# Patient Record
Sex: FEMALE | Race: BLACK OR AFRICAN AMERICAN | Employment: FULL TIME | URBAN - METROPOLITAN AREA
[De-identification: names, ages, dates, MRNs, and addresses within clinical notes are randomized per-mention and may not be internally consistent; named-entity substitution may affect disease eponyms.]

---

## 2017-01-26 ENCOUNTER — OFFICE VISIT (OUTPATIENT)
Dept: INTERNAL MEDICINE CLINIC | Age: 42
End: 2017-01-26

## 2017-01-26 VITALS
HEART RATE: 86 BPM | RESPIRATION RATE: 18 BRPM | DIASTOLIC BLOOD PRESSURE: 77 MMHG | BODY MASS INDEX: 28.12 KG/M2 | OXYGEN SATURATION: 98 % | HEIGHT: 66 IN | TEMPERATURE: 98.8 F | SYSTOLIC BLOOD PRESSURE: 116 MMHG | WEIGHT: 175 LBS

## 2017-01-26 DIAGNOSIS — Z79.899 ENCOUNTER FOR LONG-TERM (CURRENT) USE OF MEDICATIONS: ICD-10-CM

## 2017-01-26 DIAGNOSIS — Z87.898 HISTORY OF SYNCOPE: Primary | ICD-10-CM

## 2017-01-26 DIAGNOSIS — Z12.31 SCREENING MAMMOGRAM, ENCOUNTER FOR: ICD-10-CM

## 2017-01-26 DIAGNOSIS — D50.9 IRON DEFICIENCY ANEMIA, UNSPECIFIED IRON DEFICIENCY ANEMIA TYPE: ICD-10-CM

## 2017-01-26 DIAGNOSIS — Z98.84 H/O GASTRIC BYPASS: ICD-10-CM

## 2017-01-26 DIAGNOSIS — Z86.39 HISTORY OF HYPOGLYCEMIA: ICD-10-CM

## 2017-01-26 DIAGNOSIS — F31.9 BIPOLAR AFFECTIVE DISORDER, REMISSION STATUS UNSPECIFIED (HCC): ICD-10-CM

## 2017-01-26 RX ORDER — QUETIAPINE FUMARATE 100 MG/1
TABLET, FILM COATED ORAL
Refills: 1 | COMMUNITY
Start: 2016-11-23

## 2017-01-26 RX ORDER — LITHIUM CARBONATE 300 MG/1
TABLET, FILM COATED, EXTENDED RELEASE ORAL
Refills: 1 | COMMUNITY
Start: 2016-11-23

## 2017-01-26 NOTE — PROGRESS NOTES
History:   Tabby Guadarrama is a 39 y.o. female presenting today for an initial visit to [facility] care. History  gastric bypass, syncopal episode, anemia, and bipolar disorder. Pt previously received primary care from [facility]. Pt reports feeling well today. History of gastric bypass: Performed 4/2006. Pt has tolerated the procedure well overall and reports losing approximately 193 lbs. She has experienced occasional hypoglycemia. She denies n/v, abdominal pain, or diarrhea. History of syncope:  Pt reports 3 episodes in 2016. The last episode was 4 months ago. She received a workup at that time at [facility]. Pt reports that she was found to be hypoglycemic. She denies any recent episodes. She describes experiencing a warm sensation and feeling jittery prior to previous episodes. There was no h/o shaking, tongue biting, loss of bowel or bladder contents, or confusion. Since that time she has been able to control symptoms by eating when she feels the onset of symptoms. She denies chest pain, palpitations, dizziness, or headache. Anemia: Pt reports history of iron deficiency anemia. She currently takes an iron supplement. She denies history of blood transfusion. She denies SOB, chest pain, palpitations, or fatigue. Diagnosed with bipolar disorder 5 years ago. Symptoms have been stable on current regimen of lithium and seroquel. Followed by psychiatrist Dr. Vicky Shipman. Past Medical History   Diagnosis Date    Headache     Psychotic disorder        Past Surgical History   Procedure Laterality Date    Hx gi         Social History     Social History    Marital status: UNKNOWN     Spouse name: N/A    Number of children: N/A    Years of education: N/A     Occupational History    Not on file.      Social History Main Topics    Smoking status: Never Smoker    Smokeless tobacco: Never Used    Alcohol use Yes    Drug use: No    Sexual activity: Yes     Partners: Male Birth control/ protection: Condom     Other Topics Concern    Not on file     Social History Narrative    No narrative on file       Family History   Problem Relation Age of Onset    Hypertension Mother     Diabetes Mother     No Known Problems Father     No Known Problems Sister     No Known Problems Brother        No current outpatient prescriptions on file prior to visit. No current facility-administered medications on file prior to visit. No Known Allergies    Review of Systems   Constitutional: Negative. HENT: Negative. Eyes: Negative. Respiratory: Negative. Gastrointestinal: Negative. Genitourinary: Negative. Musculoskeletal: Negative. Skin: Negative. Neurological: Positive for loss of consciousness (no recent episodes). Psychiatric/Behavioral: Positive for depression. Negative for hallucinations, memory loss, substance abuse and suicidal ideas. The patient is not nervous/anxious and does not have insomnia. Objective:   VS:    Visit Vitals    /77 (BP 1 Location: Left arm, BP Patient Position: Sitting)    Pulse 86    Temp 98.8 °F (37.1 °C) (Oral)    Resp 18    Ht 5' 6\" (1.676 m)    Wt 175 lb (79.4 kg)    LMP 01/07/2017 (Approximate)    SpO2 98%    BMI 28.25 kg/m2     Physical Exam   Constitutional: She is oriented to person, place, and time. She appears well-developed and well-nourished. HENT:   Head: Normocephalic and atraumatic. Mouth/Throat: Oropharynx is clear and moist.   Eyes: Conjunctivae and EOM are normal. Pupils are equal, round, and reactive to light. Neck: Normal range of motion. Neck supple. No thyromegaly present. Cardiovascular: Normal rate, regular rhythm, normal heart sounds and intact distal pulses. Pulmonary/Chest: Effort normal and breath sounds normal.   Abdominal: Soft. Bowel sounds are normal.   Musculoskeletal: Normal range of motion. She exhibits no edema.    Neurological: She is alert and oriented to person, place, and time. She has normal reflexes. No cranial nerve deficit. Skin: Skin is warm and dry. Psychiatric: She has a normal mood and affect. Her behavior is normal.   Nursing note and vitals reviewed. Assessment/ Plan:     Elmer Solis was seen today for establish care, advice only and dizziness. Diagnoses and all orders for this visit:    History of syncope        -     No episodes in the last 4 months        -     Previous workup performed, may be related to hypoglycemia  -     Will request medical records      History of hypoglycemia        -     May be reactive hypoglycemia related to gastric surgery        -     Advised to eat a well-balanced diet with small frequent meals  -     METABOLIC PANEL, COMPREHENSIVE; Future  -     TSH 3RD GENERATION; Future    Bipolar affective disorder, remission status unspecified ([facility]        -     Continue current regimen        -     Follow up with psychiatry    Iron deficiency anemia, unspecified iron deficiency anemia type  -     CBC WITH AUTOMATED DIFF; Future    H/O gastric bypass  -     VITAMIN B12; Future  -     Folate  -     VITAMIN D, 25 HYDROXY; Future    Screening mammogram, encounter for  -     [facility] MAMMO BI SCREENING INCL CAD; Future    Encounter for long-term (current) use of medications  -     CBC WITH AUTOMATED DIFF; Future  -     LIPID PANEL; Future         I have discussed the diagnosis with the patient and the intended plan as seen in the above orders. The patient verbalized understanding and agrees with the plan.       Follow-up Disposition: Not on [facility], MD

## 2017-01-26 NOTE — MR AVS SNAPSHOT
Visit Information Date & Time Provider Department Dept. Phone Encounter #  
 1/26/2017  8:45 AM Melda Lesch, MD Patient Kaveh Blanco [id number] Follow-up Instructions Return in about 2 weeks (around 2/9/2017) for follow up lab review. Upcoming Health Maintenance Date Due  
 PAP AKA CERVICAL CYTOLOGY 7/7/2017* DTaP/Tdap/Td series (2 - Td) 1/26/2027 *Topic was postponed. The date shown is not the original due date. Allergies as of 1/26/2017  Review Complete On: 1/26/2017 By: Connor Turcios LPN No Known Allergies Current Immunizations  Never Reviewed No immunizations on file. Not reviewed this visit You Were Diagnosed With   
  
 Codes Comments History of syncope    -  Primary ICD-10-CM: H85.512 ICD-9-CM: V15.89 History of hypoglycemia     ICD-10-CM: Z86.39 
ICD-9-CM: V12.29 Bipolar affective disorder, remission status unspecified ([email or web address]     ICD-10-CM: F31.9 ICD-9-CM: 296.80 Iron deficiency anemia, unspecified iron deficiency anemia type     ICD-10-CM: D50.9 ICD-9-CM: 280.9 H/O gastric bypass     ICD-10-CM: Z98.890 ICD-9-CM: V45.86 Screening mammogram, encounter for     ICD-10-CM: Z12.31 
ICD-9-CM: V76.12 Encounter for long-term (current) use of medications     ICD-10-CM: Z79.899 ICD-9-CM: V58.69 Vitals BP Pulse Temp Resp Height(growth percentile) Weight(growth percentile) 116/77 (BP 1 Location: Left arm, BP Patient Position: Sitting) 86 98.8 °F (37.1 °C) (Oral) 18 5' 6\" (1.676 m) 175 lb (79.4 kg) LMP SpO2 BMI OB Status Smoking Status 01/07/2017 (Approximate) 98% 28.25 kg/m2 Having regular periods Never Smoker Vitals History BMI and BSA Data Body Mass Index Body Surface Area  
 28.25 kg/m 2 1.92 m 2 Your Updated Medication List  
  
   
This list is accurate as of: 1/26/17  9:43 AM.  Always use your most recent med list.  
  
  
  
  
 lithium carbonate  mg CR tablet Commonly known as:  LITHOBID  
  
 multivitamin, tx-iron-ca-min 9 mg iron-400 mcg Tab tablet Commonly known as:  THERA-M w/ IRON Take 1 Tab by mouth daily. QUEtiapine 100 mg tablet Commonly known as:  SEROquel Follow-up Instructions Return in about 2 weeks (around 2/9/2017) for follow up lab review. To-Do List   
 01/26/2017 Lab:  CBC WITH AUTOMATED DIFF   
  
 01/26/2017 Lab:  LIPID PANEL   
  
 01/26/2017 Lab:  METABOLIC PANEL, COMPREHENSIVE   
  
 01/26/2017 Lab:  TSH 3RD GENERATION   
  
 01/26/2017 Lab:  VITAMIN B12   
  
 01/26/2017 Lab:  VITAMIN D, 25 HYDROXY Introducing [facility] & HEALTH SERVICES! Francisco Hernandez introduces Browster patient portal. Now you can access parts of your medical record, email your doctor's office, and request medication refills online. 1. In your internet browser, go to [email or web address] 2. Click on the First Time User? Click Here link in the Sign In box. You will see the New Member Sign Up page. 3. Enter your Browster Access Code exactly as it appears below. You will not need to use this code after youve completed the sign-up process. If you do not sign up before the expiration date, you must request a new code. · Browster Access Code: [email or web address] Expires: 4/26/2017  8:12 AM 
 
4. Enter the last four digits of your Social Security Number (xxxx) and Date of Birth (mm/dd/yyyy) as indicated and click Submit. You will be taken to the next sign-up page. 5. Create a Browster ID. This will be your Browster login ID and cannot be changed, so think of one that is secure and easy to remember. 6. Create a Browster password. You can change your password at any time. 7. Enter your Password Reset Question and Answer. This can be used at a later time if you forget your password. 8. Enter your e-mail address.  You will receive e-mail notification when new information is available in ProtonMail. 9. Click Sign Up. You can now view and download portions of your medical record. 10. Click the Download Summary menu link to download a portable copy of your medical information. If you have questions, please visit the Frequently Asked Questions section of the ProtonMail website. Remember, ProtonMail is NOT to be used for urgent needs. For medical emergencies, dial 911. Now available from your iPhone and Android! Please provide this summary of care documentation to your next provider. If you have any questions after today's visit, please call [phone].

## 2017-01-26 NOTE — PROGRESS NOTES
Cathy Horne presents today at the clinic for. Chief Complaint   Patient presents with   [facility] Establish Care    Advice Only     Sharri    Dizziness        Wt Readings from Last 3 Encounters:   01/26/17 175 lb (79.4 kg)     Temp Readings from Last 3 Encounters:   01/26/17 98.8 °F (37.1 °C) (Oral)     BP Readings from Last 3 Encounters:   01/26/17 116/77     Pulse Readings from Last 3 Encounters:   01/26/17 86       There are no preventive care reminders to display for this patient.

## 2017-01-26 NOTE — LETTER
NOTIFICATION RETURN TO WORK  
 
1/26/2017 9:44 AM 
 
Ms. Shama Sparrow [id number] [id number] Mary Escobar [id number] To Whom It May Concern: 
 
Shama Sparrow is currently under the care of [address] [place] [address]. She will return to work on: 1/26/17 If there are questions or concerns please have the patient contact our office. Sincerely, Miguel Almodovar MD

## 2017-01-27 LAB
25(OH)D3 SERPL-MCNC: 7 NG/ML (ref 32–100)
A-G RATIO,AGRAT: 1.9 RATIO (ref 1.1–2.6)
ABSOLUTE LYMPHOCYTE COUNT, 10803: 2.2 K/UL (ref 1–4.8)
ALBUMIN SERPL-MCNC: 4.3 G/DL (ref 3.5–5)
ALP SERPL-CCNC: 56 U/L (ref 25–115)
ALT SERPL-CCNC: 9 U/L (ref 5–40)
ANION GAP SERPL CALC-SCNC: 15 MMOL/L
AST SERPL W P-5'-P-CCNC: 13 U/L (ref 10–37)
BASOPHILS # BLD: 0.1 K/UL (ref 0–0.2)
BASOPHILS NFR BLD: 1 % (ref 0–2)
BILIRUB SERPL-MCNC: 0.3 MG/DL (ref 0.2–1.2)
BUN SERPL-MCNC: 10 MG/DL (ref 6–22)
CALCIUM SERPL-MCNC: 8.8 MG/DL (ref 8.4–10.5)
CHLORIDE SERPL-SCNC: 103 MMOL/L (ref 98–110)
CHOLEST SERPL-MCNC: 156 MG/DL (ref 110–200)
CO2 SERPL-SCNC: 24 MMOL/L (ref 20–32)
CREAT SERPL-MCNC: 0.6 MG/DL (ref 0.5–1.2)
EOSINOPHIL # BLD: 0.1 K/UL (ref 0–0.5)
EOSINOPHIL NFR BLD: 2 % (ref 0–6)
ERYTHROCYTE [DISTWIDTH] IN BLOOD BY AUTOMATED COUNT: 17.1 % (ref 10–16)
GFRAA, 66117: >60
GFRNA, 66118: >60
GLOBULIN,GLOB: 2.3 G/DL (ref 2–4)
GLUCOSE SERPL-MCNC: 86 MG/DL (ref 65–99)
GRANULOCYTES,GRANS: 54 % (ref 40–75)
HCT VFR BLD AUTO: 36.9 % (ref 35.1–46.5)
HDLC SERPL-MCNC: 69 MG/DL (ref 40–59)
HGB BLD-MCNC: 10.9 G/DL (ref 11.7–15.5)
LDLC SERPL CALC-MCNC: 79 MG/DL (ref 50–99)
LYMPHOCYTES, LYMLT: 38 % (ref 27–45)
MCH RBC QN AUTO: 28 PG (ref 26–34)
MCHC RBC AUTO-ENTMCNC: 30 G/DL (ref 32–36)
MCV RBC AUTO: 93 FL (ref 80–95)
MONOCYTES # BLD: 0.3 K/UL (ref 0.1–0.9)
MONOCYTES NFR BLD: 5 % (ref 3–9)
NEUTROPHILS # BLD AUTO: 3.2 K/UL (ref 1.8–7.7)
PLATELET # BLD AUTO: 292 K/UL (ref 140–440)
PMV BLD AUTO: 12.6 FL (ref 6–10.8)
POTASSIUM SERPL-SCNC: 4.5 MMOL/L (ref 3.5–5.5)
PROT SERPL-MCNC: 6.6 G/DL (ref 6.4–8.3)
RBC # BLD AUTO: 3.95 M/UL (ref 3.8–5.2)
SODIUM SERPL-SCNC: 142 MMOL/L (ref 133–145)
TRIGL SERPL-MCNC: 40 MG/DL (ref 40–149)
TSH SERPL DL<=0.005 MIU/L-ACNC: 0.94 MCU/ML (ref 0.27–4.2)
VIT B12 SERPL-MCNC: 331 PG/ML (ref 211–911)
VLDLC SERPL CALC-MCNC: 8 MG/DL (ref 8–30)
WBC # BLD AUTO: 5.9 K/UL (ref 4–11)

## 2017-01-30 DIAGNOSIS — Z12.31 SCREENING MAMMOGRAM, ENCOUNTER FOR: ICD-10-CM

## 2017-01-31 ENCOUNTER — TELEPHONE (OUTPATIENT)
Dept: INTERNAL MEDICINE CLINIC | Age: 42
End: 2017-01-31

## 2017-02-09 ENCOUNTER — OFFICE VISIT (OUTPATIENT)
Dept: INTERNAL MEDICINE CLINIC | Age: 42
End: 2017-02-09

## 2017-02-09 VITALS
OXYGEN SATURATION: 98 % | DIASTOLIC BLOOD PRESSURE: 63 MMHG | RESPIRATION RATE: 18 BRPM | SYSTOLIC BLOOD PRESSURE: 100 MMHG | BODY MASS INDEX: 27.16 KG/M2 | WEIGHT: 169 LBS | HEART RATE: 75 BPM | TEMPERATURE: 98.3 F | HEIGHT: 66 IN

## 2017-02-09 DIAGNOSIS — E55.9 VITAMIN D DEFICIENCY: Primary | ICD-10-CM

## 2017-02-09 DIAGNOSIS — F31.9 BIPOLAR AFFECTIVE DISORDER, REMISSION STATUS UNSPECIFIED (HCC): ICD-10-CM

## 2017-02-09 DIAGNOSIS — Z86.39 HISTORY OF IRON DEFICIENCY: ICD-10-CM

## 2017-02-09 RX ORDER — ERGOCALCIFEROL 1.25 MG/1
50000 CAPSULE ORAL
Qty: 8 CAP | Refills: 0 | Status: SHIPPED | OUTPATIENT
Start: 2017-02-09

## 2017-02-09 NOTE — MR AVS SNAPSHOT
Visit Information Date & Time Provider Department Dept. Phone Encounter #  
 2/9/2017 10:45 AM Gorge Amaya MD Patient Waldemar Juarez  Upcoming Health Maintenance Date Due  
 PAP AKA CERVICAL CYTOLOGY 7/7/2017* DTaP/Tdap/Td series (2 - Td) 1/26/2027 *Topic was postponed. The date shown is not the original due date. Allergies as of 2/9/2017  Review Complete On: 2/9/2017 By: Tony De La Torre LPN No Known Allergies Current Immunizations  Never Reviewed No immunizations on file. Not reviewed this visit You Were Diagnosed With   
  
 Codes Comments Vitamin D deficiency    -  Primary ICD-10-CM: E55.9 ICD-9-CM: 268.9 Bipolar affective disorder, remission status unspecified ([facility]     ICD-10-CM: F31.9 ICD-9-CM: 296.80 Vitals BP Pulse Temp Resp Height(growth percentile) Weight(growth percentile) 100/63 (BP 1 Location: Right arm, BP Patient Position: Sitting) 75 98.3 °F (36.8 °C) (Oral) 18 5' 6\" (1.676 m) 169 lb (76.7 kg) LMP SpO2 BMI OB Status Smoking Status 02/07/2017 98% 27.28 kg/m2 Having regular periods Never Smoker BMI and BSA Data Body Mass Index Body Surface Area  
 27.28 kg/m 2 1.89 m 2 Preferred Pharmacy Pharmacy Name Phone [id number] [address] [phone] Your Updated Medication List  
  
   
This list is accurate as of: 2/9/17 11:47 AM.  Always use your most recent med list.  
  
  
  
  
 ergocalciferol 50,000 unit capsule Commonly known as:  ERGOCALCIFEROL Take 1 Cap by mouth every seven (7) days. Indications: VITAMIN D DEFICIENCY  
  
 lithium carbonate  mg CR tablet Commonly known as:  LITHOBID  
  
 multivitamin, tx-iron-ca-min 9 mg iron-400 mcg Tab tablet Commonly known as:  THERA-M w/ IRON Take 1 Tab by mouth daily. QUEtiapine 100 mg tablet Commonly known as:  SEROquel Prescriptions Sent to Pharmacy Refills  
 ergocalciferol (ERGOCALCIFEROL) 50,000 unit capsule 0 Sig: Take 1 Cap by mouth every seven (7) days. Indications: VITAMIN D DEFICIENCY Class: Normal  
 Pharmacy: [address] [place] [address] [facility] #: [phone] Route: Oral  
  
To-Do List   
 02/16/2017 12:00 PM  
  Appointment with [facility] 1 at [facility] ([phone]) OUTSIDE FILMS  - Any outside films related to the study being scheduled should be brought with you on the day of the exam.  If this cannot be done there may be a delay in the reading of the study. MEDICATIONS  - Patient must bring a complete list of all medications currently taking to include prescriptions, over-the-counter meds, herbals, vitamins & any dietary supplements  GENERAL INSTRUCTIONS  - On the day of your exam do not use any bath powder, deodorant or lotions on the armpit area. -Tenderness of breasts may cause an increase of discomfort during procedure. If you are experiencing breast tenderness on the day of your appointment and would like to reschedule, please call [phone]. Introducing [facility]! Dale Mckinney introduces [employer] patient portal. Now you can access parts of your medical record, email your doctor's office, and request medication refills online. 1. In your internet browser, go to [email or web address] 2. Click on the First Time User? Click Here link in the Sign In box. You will see the New Member Sign Up page. 3. Enter your Virtuata Access Code exactly as it appears below. You will not need to use this code after youve completed the sign-up process. If you do not sign up before the expiration date, you must request a new code. · Virtu[employer] Access Code: [email or web address] Expires: 4/26/2017  8:12 AM 
 
4.  Enter the last four digits of your Social Security Number (xxxx) and Date of Birth (mm/dd/yyyy) as indicated and click Submit. You will be taken to the next sign-up page. 5. Create a Implisit ID. This will be your Implisit login ID and cannot be changed, so think of one that is secure and easy to remember. 6. Create a Implisit password. You can change your password at any time. 7. Enter your Password Reset Question and Answer. This can be used at a later time if you forget your password. 8. Enter your e-mail address. You will receive e-mail notification when new information is available in [address] 9. Click Sign Up. You can now view and download portions of your medical record. 10. Click the Download Summary menu link to download a portable copy of your medical information. If you have questions, please visit the Frequently Asked Questions section of the Implisit website. Remember, Implisit is NOT to be used for urgent needs. For medical emergencies, dial 911. Now available from your iPhone and Android! Please provide this summary of care documentation to your next provider. If you have any questions after today's visit, please call [phone].

## 2017-02-09 NOTE — PROGRESS NOTES
Subjective:   Santos Parekh is a 39 y.o.  female who presents to review labs. Pt reports doing well since her last visit. Lab results including CMP, vitamin B12, and lipid panel were normal.  Complete blood count demonstrated mild anemia. History is significant for iron deficiency anemia. Pt reports that she has not taken her ferrous sulfate consistently. Pt was also noted to be severely vitamin D deficient. She reports that she previously took a vitamin D supplement, but has not taken one in quite some time. Counseled patient on medication compliance. She has no new complaints at this time. Review of Systems   Constitutional: Negative. HENT: Negative. Eyes: Negative. Respiratory: Negative. Cardiovascular: Negative. Gastrointestinal: Negative. Genitourinary: Negative. Musculoskeletal: Negative. Skin: Negative. Neurological: Negative. Endo/Heme/Allergies: Negative. Current Outpatient Prescriptions on File Prior to Visit   Medication Sig Dispense Refill    QUEtiapine (SEROQUEL) 100 mg tablet   1    multivitamin, tx-iron-ca-min (THERA-M W/ IRON) 9 mg iron-400 mcg tab tablet Take 1 Tab by mouth daily.  lithium carbonate SR (LITHOBID) 300 mg CR tablet   1     No current facility-administered medications on file prior to visit. Reviewed PmHx, RxHx, FmHx, SocHx, AllgHx and updated and dated in the chart. Nurse notes were reviewed and are correct    Objective:     Vitals:    02/09/17 1107   BP: 100/63   Pulse: 75   Resp: 18   Temp: 98.3 °F (36.8 °C)   TempSrc: Oral   SpO2: 98%   Weight: 169 lb (76.7 kg)   Height: 5' 6\" (1.676 m)     Physical Exam   Constitutional: She is oriented to person, place, and time. She appears well-developed and well-nourished. HENT:   Head: Normocephalic and atraumatic. Eyes: EOM are normal. Pupils are equal, round, and reactive to light. Neck: Normal range of motion. Neck supple.    Cardiovascular: Normal rate, regular rhythm, normal heart sounds and intact distal pulses. Pulmonary/Chest: Effort normal.   Neurological: She is alert and oriented to person, place, and time. Skin: Skin is warm and dry. Psychiatric: She has a normal mood and affect. Her behavior is normal.   Nursing note and vitals reviewed. Assessment/ Plan:     Pili Bianchi was seen today for results. Diagnoses and all orders for this visit:    Vitamin D deficiency  -     ergocalciferol (ERGOCALCIFEROL) 50,000 unit capsule; Take 1 Cap by mouth every seven (7) days. Indications: VITAMIN D DEFICIENCY    Bipolar affective disorder, remission status unspecified (HCC)        -     Stable        -     Continue current regimen    History of iron deficiency        -     Counseled on consistent use of ferrous sulfate       I have discussed the diagnosis with the patient and the intended plan as seen in the above orders. The patient verbalized understanding and agrees with the plan. Follow-up Disposition:  Return in about 6 months (around 8/9/2017).     Jessi Yeboah MD

## 2017-02-09 NOTE — PROGRESS NOTES
Chief Complaint   Patient presents with    Results     Lab work   1. Have you been to the ER, urgent care clinic since your last visit? Hospitalized since your last visit? No    2. Have you seen or consulted any other health care providers outside of the [address] [place] [address] since your last visit? Include any pap smears or colon screening.  No